# Patient Record
Sex: MALE | Race: BLACK OR AFRICAN AMERICAN
[De-identification: names, ages, dates, MRNs, and addresses within clinical notes are randomized per-mention and may not be internally consistent; named-entity substitution may affect disease eponyms.]

---

## 2022-11-30 ENCOUNTER — HOSPITAL ENCOUNTER (EMERGENCY)
Dept: HOSPITAL 26 - MED | Age: 47
LOS: 1 days | Discharge: PSYCHIATRIC OR SUBSTANCE USE FACILITY | End: 2022-12-01
Payer: MEDICAID

## 2022-11-30 VITALS — BODY MASS INDEX: 25.2 KG/M2 | HEIGHT: 71 IN | WEIGHT: 180 LBS

## 2022-11-30 VITALS — DIASTOLIC BLOOD PRESSURE: 93 MMHG | SYSTOLIC BLOOD PRESSURE: 139 MMHG

## 2022-11-30 DIAGNOSIS — R44.3: ICD-10-CM

## 2022-11-30 DIAGNOSIS — F15.90: ICD-10-CM

## 2022-11-30 DIAGNOSIS — R45.851: Primary | ICD-10-CM

## 2022-11-30 DIAGNOSIS — F12.90: ICD-10-CM

## 2022-11-30 DIAGNOSIS — Z79.899: ICD-10-CM

## 2022-11-30 DIAGNOSIS — F20.9: ICD-10-CM

## 2022-11-30 DIAGNOSIS — Z20.822: ICD-10-CM

## 2022-11-30 DIAGNOSIS — F17.210: ICD-10-CM

## 2022-11-30 DIAGNOSIS — F32.9: ICD-10-CM

## 2022-11-30 LAB
ALBUMIN FLD-MCNC: 3.7 G/DL (ref 3.4–5)
ANION GAP SERPL CALCULATED.3IONS-SCNC: 10.2 MMOL/L (ref 8–16)
APAP SERPL-MCNC: < 0.5 UG/ML (ref 10–30)
AST SERPL-CCNC: 20 U/L (ref 15–37)
BASOPHILS # BLD AUTO: 0 K/UL (ref 0–0.22)
BASOPHILS NFR BLD AUTO: 0.8 % (ref 0–2)
BILIRUB SERPL-MCNC: 0.1 MG/DL (ref 0–1)
BUN SERPL-MCNC: 15 MG/DL (ref 7–18)
CHLORIDE SERPL-SCNC: 104 MMOL/L (ref 98–107)
CO2 SERPL-SCNC: 32.9 MMOL/L (ref 21–32)
CREAT SERPL-MCNC: 0.8 MG/DL (ref 0.6–1.3)
EOSINOPHIL # BLD AUTO: 0.2 K/UL (ref 0–0.4)
EOSINOPHIL NFR BLD AUTO: 3.5 % (ref 0–4)
ERYTHROCYTE [DISTWIDTH] IN BLOOD BY AUTOMATED COUNT: 14.7 % (ref 11.6–13.7)
GFR SERPL CREATININE-BSD FRML MDRD: 134 ML/MIN (ref 90–?)
GLUCOSE SERPL-MCNC: 92 MG/DL (ref 74–106)
HCT VFR BLD AUTO: 40.6 % (ref 36–52)
HGB BLD-MCNC: 13.7 G/DL (ref 12–18)
LYMPHOCYTES # BLD AUTO: 2.3 K/UL (ref 2–11.5)
LYMPHOCYTES NFR BLD AUTO: 50.9 % (ref 20.5–51.1)
MCH RBC QN AUTO: 31 PG (ref 27–31)
MCHC RBC AUTO-ENTMCNC: 34 G/DL (ref 33–37)
MCV RBC AUTO: 92.9 FL (ref 80–94)
MONOCYTES # BLD AUTO: 0.3 K/UL (ref 0.8–1)
MONOCYTES NFR BLD AUTO: 6.9 % (ref 1.7–9.3)
NEUTROPHILS # BLD AUTO: 1.7 K/UL (ref 1.8–7.7)
NEUTROPHILS NFR BLD AUTO: 37.9 % (ref 42.2–75.2)
PLATELET # BLD AUTO: 239 K/UL (ref 140–450)
POTASSIUM SERPL-SCNC: 4.1 MMOL/L (ref 3.5–5.1)
RBC # BLD AUTO: 4.37 MIL/UL (ref 4.2–6.1)
SALICYLATES SERPL-MCNC: 2.8 MG/DL (ref 2.8–20)
SODIUM SERPL-SCNC: 143 MMOL/L (ref 136–145)
WBC # BLD AUTO: 4.5 K/UL (ref 4.8–10.8)

## 2022-11-30 PROCEDURE — 87635 SARS-COV-2 COVID-19 AMP PRB: CPT

## 2022-11-30 PROCEDURE — 80305 DRUG TEST PRSMV DIR OPT OBS: CPT

## 2022-11-30 PROCEDURE — 99285 EMERGENCY DEPT VISIT HI MDM: CPT

## 2022-11-30 PROCEDURE — 87426 SARSCOV CORONAVIRUS AG IA: CPT

## 2022-11-30 PROCEDURE — 36415 COLL VENOUS BLD VENIPUNCTURE: CPT

## 2022-11-30 PROCEDURE — G0482 DRUG TEST DEF 15-21 CLASSES: HCPCS

## 2022-11-30 PROCEDURE — 85025 COMPLETE CBC W/AUTO DIFF WBC: CPT

## 2022-11-30 PROCEDURE — 80053 COMPREHEN METABOLIC PANEL: CPT

## 2022-11-30 PROCEDURE — G0480 DRUG TEST DEF 1-7 CLASSES: HCPCS

## 2022-11-30 NOTE — NUR
ALL ITEMS HAVE REMOVED FROM ROOM FOR PT SAFTEY.  PT IN GOWN.  Q15 SI CHECKS PT 
IN VIEW FROM NURSING STATION

## 2022-11-30 NOTE — NUR
46YR OLD MALE C/O SI .  PT BROUGHT FROM THE STREETS .  PT STATES " I WANT TO 
KILL MYSELF"  .  5150 PLACED BY DR MARSHALL.  SI Q15 CHECKS PT IN GOWN.  ALL 
ITEMS REMOVED FROM ROOM.  ALL PERSONAL BELONGINGS BAGGED AND TAGGED



NKDA



DEPRESSION 

BIPOLAR

## 2022-12-01 VITALS — DIASTOLIC BLOOD PRESSURE: 85 MMHG | SYSTOLIC BLOOD PRESSURE: 133 MMHG

## 2022-12-01 LAB
BARBITURATES UR QL SCN: NEGATIVE NG/ML
BENZODIAZ UR QL SCN: NEGATIVE NG/ML
BZE UR QL SCN: NEGATIVE NG/ML
CANNABINOIDS UR QL SCN: NEGATIVE NG/ML
OPIATES UR QL SCN: POSITIVE NG/ML
PCP UR QL SCN: NEGATIVE NG/ML

## 2022-12-01 NOTE — NUR
Patient to be transferred to [facility].  Is being 
transferred due to Higher level of Care.  Receiving facility has accepting 
physician and available space. ER physician has signed transfer form.  Patient 
or responsible party has agreed to transfer and signed form.  Patient 
belongings inventoried and will be sent with patient.  Copy of nursing notes, 
lab reports, EKG, Physicians Orders and X-rays to be sent with patient.  Report 
called to Yesy Garcia, at receiving facility.  [facility] ambulance service has been 
called for transfer.  ETA is 90min.

## 2022-12-01 NOTE — NUR
PRIMITIVO Masters from [facility]. Pt has been accepted at [facility] [address] Room 1002 Bed A. Accepted by Dr. Lyles. 



Address: [address] [place] [address]



Number for report: [phone]

## 2022-12-01 NOTE — NUR
PT RESTING IN BED RESP EVEN AND UNLABORED.  FOOD PROVIDED TO PT.  BED AT LOWEST 
POSITION SIDE RAILS UP X2